# Patient Record
Sex: MALE | Race: WHITE | Employment: FULL TIME | URBAN - METROPOLITAN AREA
[De-identification: names, ages, dates, MRNs, and addresses within clinical notes are randomized per-mention and may not be internally consistent; named-entity substitution may affect disease eponyms.]

---

## 2020-02-04 ENCOUNTER — HOSPITAL ENCOUNTER (OUTPATIENT)
Age: 50
Discharge: HOME | End: 2020-02-04
Payer: OTHER GOVERNMENT

## 2020-02-04 ENCOUNTER — HOSPITAL ENCOUNTER (OUTPATIENT)
Dept: PULMONOLOGY | Age: 50
Discharge: HOME | End: 2020-02-04
Payer: OTHER GOVERNMENT

## 2020-02-04 ENCOUNTER — HOSPITAL ENCOUNTER (OUTPATIENT)
Dept: GENERAL RADIOLOGY | Age: 50
Discharge: HOME | End: 2020-02-04
Payer: OTHER GOVERNMENT

## 2020-02-04 PROCEDURE — 94729 DIFFUSING CAPACITY: CPT

## 2020-02-04 PROCEDURE — 71046 X-RAY EXAM CHEST 2 VIEWS: CPT

## 2020-02-04 PROCEDURE — 94760 N-INVAS EAR/PLS OXIMETRY 1: CPT

## 2020-02-04 PROCEDURE — 6370000000 HC RX 637 (ALT 250 FOR IP): Performed by: ORTHOPAEDIC SURGERY

## 2020-02-04 PROCEDURE — 94060 EVALUATION OF WHEEZING: CPT

## 2020-02-04 PROCEDURE — 94726 PLETHYSMOGRAPHY LUNG VOLUMES: CPT

## 2020-02-04 RX ORDER — ALBUTEROL SULFATE 90 UG/1
4 AEROSOL, METERED RESPIRATORY (INHALATION) ONCE
Status: COMPLETED | OUTPATIENT
Start: 2020-02-04 | End: 2020-02-04

## 2020-02-04 RX ADMIN — Medication 4 PUFF: at 09:43

## 2020-02-04 NOTE — PROCEDURES
[facility] [address], [place] [address]                               PULMONARY FUNCTION    PATIENT NAME: Pee Ramachandran                      :        1970  MED REC NO:   [id number]                          ROOM:  ACCOUNT NO:   [de-identified]                           ADMIT DATE: 2020  PROVIDER:     Jorge Luis Sahu MD    DATE OF PROCEDURE:  2020    PFT INTERPRETATION    The patient is a 28-year-old male who underwent a PFT for unspecified  asthma. Spirometry shows FVC to be 102%. The patient has FEV1 of 92,  FEV1 to FVC ratio was 90, FEF 25%-75% was 75. The patient had  significant postbronchodilator improvement on the spirometry test.  Lung  volume shows the total lung capacity was not reduced. The patient does  have some air trapping along with hyperinflation. The patient also has  decrease in ERV which maybe because of body habitus. The patient's  diffusion capacity when adjusted for volume was normal.  The patient's  flow-volume loop was normal.  On the basis of this PFT, the patient has  some small airway disease. Along with that, the patient has reactive  airway disease. Along with that, the patient has some changes in the  PFT parameters because of body habitus. Please correlate clinically.         Prudencio Phillip MD    D: 2020 17:01:25       T: 2020 17:58:28     [email or web address]  Job#: [id number]     Doc#: [id number]    CC: